# Patient Record
(demographics unavailable — no encounter records)

---

## 2024-12-18 NOTE — ASSESSMENT
[FreeTextEntry1] : 12 y/o F presenting for initial evaluation of headaches. Patient was admitted to GPS at [facility] from 12/13- 12/15 for suspected meningitis, blood and urine cultures negative s/p abx treatment and discharged with diagnosis of migraines. Neurologic examination non focal at this time. Semiology is consistent with low pressure headache, likely secondary to the recent lumbar puncture on 12/13. Will prescribe gabapentin 100mg TID, caffeine over the counter, and increase fluid intake. RTC in 3 weeks.

## 2024-12-18 NOTE — CONSULT LETTER
[Dear  ___] : Dear  [unfilled], [Consult Letter:] : I had the pleasure of evaluating your patient, [unfilled]. [( Thank you for referring [unfilled] for consultation for _____ )] : Thank you for referring [unfilled] for consultation for [unfilled] [Please see my note below.] : Please see my note below. [Consult Closing:] : Thank you very much for allowing me to participate in the care of this patient.  If you have any questions, please do not hesitate to contact me. [Sincerely,] : Sincerely, [FreeTextEntry3] : Dr. Atul Garcia MD Attending Physician Pediatric Neurology and Epilepsy

## 2024-12-18 NOTE — PLAN
[FreeTextEntry1] : - Review of exacerbating factors (sleep hygiene, meal intake, food triggers) as well as alleviating measures (e.g. sleep, NSAID usage, light aerobic exercise) were reviewed.  - Recommended keeping headache diary to further monitor headache frequency, intensity, alleviating and exacerbating factors. - Gabapentin 100mg TID - Caffeine over the counter - Increase hydration  - Return to clinic in 3 weeks

## 2024-12-18 NOTE — HISTORY OF PRESENT ILLNESS
[FreeTextEntry1] : 12 y/o F presenting for initial evaluation of headaches. Patient was admitted to GPS at [facility] from 12/13- 12/15 for suspected meningitis, blood and urine cultures negative s/p abx treatment and discharged with diagnosis of catamenial migraines.   Headache semiology: frontotemporal band-like sensation 8/10 severity, currently 6/10 Associated Features: photophobia, phonophobia, nausea, vomiting, word finding difficulties/expressive aphasia Frequency and Duration: worse when getting up and standing, persistent since Thursday (admission date, after LP) Exacerbating Symptoms: standing up and sitting Alleviating Symptoms: sleep, laying down, water Medication use: Ibuprofen 400mg qd since 12/16 Response to medication: positive response Sleep History: 8-9 hours Social History: lives with parents, safe at home and in neighborhood. Currently 11th grader, no bullying, enjoys school. Denies SI/A or HI/A. Denies illicit drug use/alcohol/smoking. Never sexually active. No OCPs.   History Reviewed (Discharge Summary from [facility] hospitalization):  12 y/o previously healthy female transferred from OSH after patient presented with severe headache, nausea, vomiting, and dysarthria. Alva reported that symptoms began early the morning of presentation with a severe headache, described as a band feeling, pain 8/10, made worse with light and loud sounds. Later that afternoon Alva began to feel nauseous and had an episode of NBNB. The family became concerned when Alva began to have difficulty speaking and was not making sense. During episodes, Alva was aware of questions and answer she was trying to give, but was having difficulty expressing the correct words. Alva with no associated LOC, fever, chills, nuchal rigidity, dizziness, vision changes, cough, sore throat, chest pain, SOB, abdominal pain, diarrhea, hematochezia, dysuria, rash or hematuria. She had a viral illness 3 weeks ago with a persistent cough but denies other recent illness. No recent travel. She reports school friend with recent Bronchitis.  LMP 12/12/24: today second day.  At [facility] ED: CT scan was done which was unremarkable for any CNS pathology, they attempted LP, however it was a dry tap. CBC showed elevated WBC and given the patient's presentation they decided to treat as suspected Meningitis. She received a bolus of fluid, started on Acyclovir, Vancomycin and acyclovir. Urinalysis, urine toxicology, CMP and RVP also sent at [facility].  On arrival, pt well appearing, non toxic, much improved, slight headache. ID was consulted and given presentation, infection less likely. Acyclovir and Vancomycin discontinued. Ceftriaxone continued until blood cultures -ve 24 hours. Repeat CBC much improved with WBC 7. RVP negative. Patient tolerating PO with some mild nausea. Vitals were closely monitored, remained stable. Toradol given ATC for headache/pain control near LP site. This AM, patient began developing severe headache with photophobia. Pt give benadryl, toradol, and raglan with complete relief. Symptoms likely 2/2 to complex Migraine in setting of period.

## 2024-12-18 NOTE — REASON FOR VISIT
[Initial Consultation] : an initial consultation for [Headache] : headache [Patient] : patient [Mother] : mother [Father] : father

## 2024-12-18 NOTE — PHYSICAL EXAM
[Well-appearing] : well-appearing [Alert] : alert [Well related, good eye contact] : well related, good eye contact [Conversant] : conversant [Normal speech and language] : normal speech and language [Follows instructions well] : follows instructions well [VFF] : VFF [Pupils reactive to light and accommodation] : pupils reactive to light and accommodation [Full extraocular movements] : full extraocular movements [Saccadic and smooth pursuits intact] : saccadic and smooth pursuits intact [No nystagmus] : no nystagmus [Normal facial sensation to light touch] : normal facial sensation to light touch [No facial asymmetry or weakness] : no facial asymmetry or weakness [Gross hearing intact] : gross hearing intact [Equal palate elevation] : equal palate elevation [Normal tongue movement] : normal tongue movement [Good shoulder shrug] : good shoulder shrug [Normal axial and appendicular muscle tone] : normal axial and appendicular muscle tone [Gets up on table without difficulty] : gets up on table without difficulty [No pronator drift] : no pronator drift [Normal finger tapping and fine finger movements] : normal finger tapping and fine finger movements [No abnormal involuntary movements] : no abnormal involuntary movements [5/5 strength in proximal and distal muscles of arms and legs] : 5/5 strength in proximal and distal muscles of arms and legs [Walks and runs well] : walks and runs well [Able to walk on heels] : able to walk on heels [Able to walk on toes] : able to walk on toes [2+ biceps] : 2+ biceps [Triceps] : triceps [Knee jerks] : knee jerks [Ankle jerks] : ankle jerks [No ankle clonus] : no ankle clonus [Localizes LT and temperature] : localizes LT and temperature [No dysmetria on FTNT] : no dysmetria on FTNT [Good walking balance] : good walking balance [Normal gait] : normal gait [Able to tandem well] : able to tandem well [Negative Romberg] : negative Romberg [No papilledema] : no papilledema [Midline tongue, no fasciculations] : midline tongue, no fasciculations

## 2025-01-07 NOTE — PHYSICAL EXAM
[Well-appearing] : well-appearing [Alert] : alert [Well related, good eye contact] : well related, good eye contact [Conversant] : conversant [Normal speech and language] : normal speech and language [Follows instructions well] : follows instructions well [VFF] : VFF [Pupils reactive to light and accommodation] : pupils reactive to light and accommodation [Full extraocular movements] : full extraocular movements [Saccadic and smooth pursuits intact] : saccadic and smooth pursuits intact [No nystagmus] : no nystagmus [Normal facial sensation to light touch] : normal facial sensation to light touch [No facial asymmetry or weakness] : no facial asymmetry or weakness [Gross hearing intact] : gross hearing intact [Equal palate elevation] : equal palate elevation [Good shoulder shrug] : good shoulder shrug [Normal tongue movement] : normal tongue movement [Midline tongue, no fasciculations] : midline tongue, no fasciculations [Normal axial and appendicular muscle tone] : normal axial and appendicular muscle tone [Gets up on table without difficulty] : gets up on table without difficulty [No pronator drift] : no pronator drift [Normal finger tapping and fine finger movements] : normal finger tapping and fine finger movements [No abnormal involuntary movements] : no abnormal involuntary movements [5/5 strength in proximal and distal muscles of arms and legs] : 5/5 strength in proximal and distal muscles of arms and legs [Walks and runs well] : walks and runs well [Able to walk on heels] : able to walk on heels [Able to walk on toes] : able to walk on toes [2+ biceps] : 2+ biceps [Triceps] : triceps [Knee jerks] : knee jerks [Ankle jerks] : ankle jerks [No ankle clonus] : no ankle clonus [Localizes LT and temperature] : localizes LT and temperature [No dysmetria on FTNT] : no dysmetria on FTNT [Good walking balance] : good walking balance [Normal gait] : normal gait [Able to tandem well] : able to tandem well [Negative Romberg] : negative Romberg

## 2025-01-07 NOTE — PLAN
[FreeTextEntry1] : - Review of exacerbating factors (sleep hygiene, meal intake, food triggers) as well as alleviating measures (e.g. sleep, NSAID usage, light aerobic exercise) were reviewed.  - Recommended keeping headache diary to further monitor headache frequency, intensity, alleviating and exacerbating factors. - Prophylactic measures: none - Abortive measures: Ibuprofen/Naproxen PRN - Return to clinic in 6 months or PRN.

## 2025-01-07 NOTE — HISTORY OF PRESENT ILLNESS
[FreeTextEntry1] : 14 y/o F presenting for follow evaluation of low pressure headaches and for possible migraines. Last seen in 12/2024.  Interval History: Since last visit, her headaches have improved with hydration, and intermittent use of caffeine. Could not tolerate GBP after the first two doses (sedation, dizziness). Symptoms resolved within 3-4 days from last visit. Since then no additional headaches outside of menstruation, noted to have small headache yesterday (1/6) responded to having a meal (similar semiology as reviewed previously- bifrontal, no photophobia or phonophobia, mild nausea). Doing well otherwise.   Headache History Reviewed: Headache semiology: frontotemporal band-like sensation 8/10 severity, currently 6/10 Associated Features: photophobia, phonophobia, nausea, vomiting, word finding difficulties/expressive aphasia Frequency and Duration: worse when getting up and standing, persistent since Thursday (admission date, after LP) Exacerbating Symptoms: standing up and sitting Alleviating Symptoms: sleep, laying down, water Medication use: Ibuprofen 400mg qd since 12/16 Response to medication: positive response Sleep History: 8-9 hours Social History: lives with parents, safe at home and in neighborhood. Currently 11th grader, no bullying, enjoys school. Denies SI/A or HI/A. Denies illicit drug use/alcohol/smoking. Never sexually active. No OCPs.

## 2025-01-07 NOTE — ASSESSMENT
[FreeTextEntry1] : 14 y/o F presenting for follow up evaluation of headaches. Her initial presentation of low pressure headaches sustained likely from the lumbar puncture from the recent admission in 12/13-12/15 has resolved, her secondary subtype of headaches are likely migraines, which are exacerbated during menstruation. She currently is tolerating ibuprofen as needed for these headaches, and do not occur outside of this period. Will continue to monitor with ibuprofen/naproxen as PRN, no preventative warranted at this time. RTC PRN or 6 months.

## 2025-01-07 NOTE — REASON FOR VISIT
[Follow-Up Evaluation] : a follow-up evaluation for [Headache] : headache [Patient] : patient [Mother] : mother [Father] : father